# Patient Record
Sex: FEMALE | Race: WHITE
[De-identification: names, ages, dates, MRNs, and addresses within clinical notes are randomized per-mention and may not be internally consistent; named-entity substitution may affect disease eponyms.]

---

## 2017-09-14 ENCOUNTER — HOSPITAL ENCOUNTER (EMERGENCY)
Dept: HOSPITAL 10 - FTE | Age: 32
Discharge: HOME | End: 2017-09-14
Payer: MEDICAID

## 2017-09-14 VITALS
BODY MASS INDEX: 25.43 KG/M2 | BODY MASS INDEX: 25.43 KG/M2 | WEIGHT: 162.04 LBS | HEIGHT: 67 IN | WEIGHT: 162.04 LBS | HEIGHT: 67 IN

## 2017-09-14 DIAGNOSIS — O26.891: Primary | ICD-10-CM

## 2017-09-14 DIAGNOSIS — Z3A.01: ICD-10-CM

## 2017-09-14 LAB
ADD UMIC: NO
BASOPHILS # BLD AUTO: 0 10^3/UL (ref 0–0.1)
BASOPHILS NFR BLD: 0.3 % (ref 0–2)
COLOR UR: YELLOW
EOSINOPHIL # BLD: 0 10^3/UL (ref 0–0.5)
EOSINOPHIL NFR BLD: 0.2 % (ref 0–7)
ERYTHROCYTE [DISTWIDTH] IN BLOOD BY AUTOMATED COUNT: 12.1 % (ref 11.5–14.5)
GLUCOSE UR STRIP-MCNC: NEGATIVE MG/DL
HCT VFR BLD CALC: 37.1 % (ref 37–47)
HGB BLD-MCNC: 12.5 G/DL (ref 12–16)
KETONES UR STRIP.AUTO-MCNC: NEGATIVE MG/DL
LYMPHOCYTES # BLD AUTO: 2.2 10^3/UL (ref 0.8–2.9)
LYMPHOCYTES NFR BLD AUTO: 22.6 % (ref 15–51)
MCH RBC QN AUTO: 32.1 PG (ref 29–33)
MCHC RBC AUTO-ENTMCNC: 33.7 G/DL (ref 32–37)
MCV RBC AUTO: 95.4 FL (ref 82–101)
MONOCYTES # BLD: 0.6 10^3/UL (ref 0.3–0.9)
MONOCYTES NFR BLD: 6.1 % (ref 0–11)
NEUTROPHILS # BLD: 6.8 10^3/UL (ref 1.6–7.5)
NEUTROPHILS NFR BLD AUTO: 70.3 % (ref 39–77)
NITRITE UR QL STRIP.AUTO: NEGATIVE MG/DL
NRBC # BLD MANUAL: 0 10^3/UL (ref 0–0)
NRBC BLD AUTO-RTO: 0 /100WBC (ref 0–0)
PLATELET # BLD: 336 10^3/UL (ref 140–415)
PMV BLD AUTO: 9.9 FL (ref 7.4–10.4)
RBC # BLD AUTO: 3.89 10^6/UL (ref 4.2–5.4)
RBC # UR AUTO: NEGATIVE MG/DL
UR BILIRUBIN (DIP): NEGATIVE MG/DL
UR CLARITY: CLEAR
UR PH (DIP): 6 (ref 5–9)
UR SPECIFIC GRAVITY (DIP): < 1.005 (ref 1–1.03)
UR TOTAL PROTEIN (DIP): NEGATIVE MG/DL
UROBILINOGEN UR STRIP-ACNC: (no result) MG/DL
WBC # BLD AUTO: 9.7 10^3/UL (ref 4.8–10.8)
WBC # UR STRIP: NEGATIVE LEU/UL

## 2017-09-14 PROCEDURE — 84702 CHORIONIC GONADOTROPIN TEST: CPT

## 2017-09-14 PROCEDURE — 86901 BLOOD TYPING SEROLOGIC RH(D): CPT

## 2017-09-14 PROCEDURE — 85025 COMPLETE CBC W/AUTO DIFF WBC: CPT

## 2017-09-14 PROCEDURE — 86900 BLOOD TYPING SEROLOGIC ABO: CPT

## 2017-09-14 PROCEDURE — 76801 OB US < 14 WKS SINGLE FETUS: CPT

## 2017-09-14 PROCEDURE — 81003 URINALYSIS AUTO W/O SCOPE: CPT

## 2017-09-14 PROCEDURE — 36415 COLL VENOUS BLD VENIPUNCTURE: CPT

## 2017-09-14 PROCEDURE — 76817 TRANSVAGINAL US OBSTETRIC: CPT

## 2017-09-14 NOTE — RADRPT
PROCEDURE:   US OB. 

 

CLINICAL INDICATION:   Vaginal bleeding

 

TECHNIQUE:   Transabdominal and endovaginal imaging of the gravid uterus is available for review 

 

COMPARISON:   None available  

 

FINDINGS:

 

There is a single intrauterine pregnancy demonstrating a heart rate of 148 bpm.  The mean sac diamet
er equals 1.6 cm, giving an estimated gestational age of 6 weeks 2 days by ultrasound criteria. A yo
lk sac is present. The fetal pole is not well visualized, however upper fetal cardiac activity is de
monstrated. No subchorionic hemorrhage is identified. The ovaries are unremarkable.

 

IMPRESSION:

 

Single live intrauterine pregnancy with an estimated gestational age of 6 weeks 2 days by ultrasound
 criteria and an estimated date of delivery of 05/08/2018. The fetal pole is not well visualized alt
kassandra fetal cardiac activity is identified. Follow-up ultrasound in 1 week is recommended to ensure 
interval growth.

 

RPTAT: HH

_____________________________________________ 

.Isabelle Werner MD, MD           Date    Time 

Electronically viewed and signed by .Isabelle Werner MD, MD on 09/14/2017 11:38 

 

D:  09/14/2017 11:38  T:  09/14/2017 11:38

.G/

## 2017-09-14 NOTE — ERD
ER Documentation


Chief Complaint


Date/Time


DATE: 9/14/17 


TIME: 12:15


Chief Complaint


lower abdominal pain radiates to the back -  PREGNANT - LMP 7/13/17





HPI


This 32-year-old female complains of some lower abdominal pain the left side 

for last few days.  Radiates slightly to her back.  She is possibly 8 or 9 

weeks pregnant by dates.  She has bleeding or dysuria or fevers or vomiting.  

She is a G1 para 0.





ROS


All systems reviewed and are negative except as per history of present illness.





Medications


Home Meds


Active Scripts


Acetaminophen* (Tylophen*) 500 Mg Capsule, 1 CAP PO Q6H Y for PAIN AND OR 

ELEVATED TEMP, #15 CAP


   Prov:CARL OROURKE MD         9/14/17


Ibuprofen* (Motrin*) 600 Mg Tab, 600 MG PO Q6, #20 TAB


   Prov:CARL OROURKE MD         9/6/16





PMhx/Soc


Medical and Surgical Hx:  pt denies Medical Hx, pt denies Surgical Hx


Hx Alcohol Use:  No


Hx Substance Use:  No


Hx Tobacco Use:  No


Smoking Status:  Never smoker





Physical Exam


Vitals





Vital Signs








  Date Time  Temp Pulse Resp B/P Pulse Ox O2 Delivery O2 Flow Rate FiO2


 


9/14/17 09:52 98.9 74 19 118/71 100   








Physical Exam


Const:  []Alert, not ill-appearing.


Head:   Atraumatic 


Eyes:    Normal Conjunctiva


ENT:    Normal External Ears, Nose and Mouth.


Neck:               Full range of motion..~ No meningismus.


Resp:    Clear to auscultation bilaterally


Cardio:    Regular rate and rhythm, no murmurs


Abd:    Soft,Minimal left mid abdominal tenderness.  No rebound and no 

tenderness at McBurney's point no James sign. non distended. Normal bowel 

sounds


Skin:    No petechiae or rashes


Back:    No midline or flank tenderness


Ext:    No cyanosis, or edema


Neur:    Awake and alert


Psych:    Normal Mood and Affect


Result Diagram:  


9/14/17 1107





Results 24 hrs





 Laboratory Tests








Test


  9/14/17


11:07 9/14/17


11:09


 


White Blood Count 9.710^3/ul  


 


Red Blood Count 3.8910^6/ul  


 


Hemoglobin 12.5g/dl  


 


Hematocrit 37.1%  


 


Mean Corpuscular Volume 95.4fl  


 


Mean Corpuscular Hemoglobin 32.1pg  


 


Mean Corpuscular Hemoglobin


Concent 33.7g/dl 


  


 


 


Red Cell Distribution Width 12.1%  


 


Platelet Count 89949^3/UL  


 


Mean Platelet Volume 9.9fl  


 


Neutrophils % 70.3%  


 


Lymphocytes % 22.6%  


 


Monocytes % 6.1%  


 


Eosinophils % 0.2%  


 


Basophils % 0.3%  


 


Nucleated Red Blood Cells % 0.0/100WBC  


 


Neutrophils # 6.810^3/ul  


 


Lymphocytes # 2.210^3/ul  


 


Monocytes # 0.610^3/ul  


 


Eosinophils # 0.010^3/ul  


 


Basophils # 0.010^3/ul  


 


Nucleated Red Blood Cells # 0.010^3/ul  


 


Urine Color  YELLOW 


 


Urine Clarity  CLEAR 


 


Urine pH  6.0 


 


Urine Specific Gravity  < 1.005 


 


Urine Ketones  NEGATIVEmg/dL 


 


Urine Nitrite  NEGATIVEmg/dL 


 


Urine Bilirubin  NEGATIVEmg/dL 


 


Urine Urobilinogen


  


  0.2


E.U./dLmg/dL


 


Urine Leukocyte Esterase  NEGATIVELeu/ul 


 


Urine Hemoglobin  NEGATIVEmg/dL 


 


Urine Glucose  NEGATIVEmg/dL 


 


Urine Total Protein  NEGATIVEmg/dl 











Procedures/MDM


Urine is negative for infection, hemoglobin, glucose.  Pelvic ultrasound shows 

approximately 6 week intrauterine likely gestational sac and yolk sac with 

fetal heart tones.  There is no evidence of ectopic pregnancy or adnexal 

masses.  Patient presents with lower abdominal pain of uncertain etiology and 

early pregnancy.  CBC is normal.  Signs or symptoms do not suggest ectopic 

pregnancy, appendicitis, UTI, additional causes of presenting complaints.  She 

will discharged home with instructions for rest, fluids, Tylenol and primary 

care and OB follow-up.  The patient was stable with no new complaints during 

the ER course. Clinically, there is no current evidence to suggest meningitis, 

sepsis, acute abdomen, pneumonia, acute coronary syndrome, pulmonary embolism, 

or any other emergent condition appearing to require further evaluation or 

hospitalization. The patient should certainly return for any new or worsening 

symptoms per the aftercare instructions. They should otherwise follow-up with 

her primary care doctor for reevaluation this week.





Departure


Diagnosis:  


 Primary Impression:  


 Abdominal pain


 Abdominal location:  left lower quadrant  Qualified Code:  R10.32 - Left lower 

quadrant pain


Condition:  Stable


Patient Instructions:  Abdominal Pain, Early Pregnancy





Additional Instructions:  


Examines normal hoy. ULTRASONIDO DICE HOMAR EMBARAZO DE 6 SEMANAS EN MATRIZ. 

Cheque otro vez con murphy doctor primario en el proximo noel or regresa para mas o 

nueva simptomas.











CARL OROURKE MD Sep 14, 2017 12:17

## 2017-10-05 ENCOUNTER — HOSPITAL ENCOUNTER (EMERGENCY)
Dept: HOSPITAL 10 - FTE | Age: 32
Discharge: HOME | End: 2017-10-05
Payer: MEDICAID

## 2017-10-05 VITALS
DIASTOLIC BLOOD PRESSURE: 73 MMHG | TEMPERATURE: 98.3 F | HEART RATE: 93 BPM | SYSTOLIC BLOOD PRESSURE: 116 MMHG | RESPIRATION RATE: 16 BRPM

## 2017-10-05 VITALS
BODY MASS INDEX: 27.1 KG/M2 | WEIGHT: 158.73 LBS | WEIGHT: 158.73 LBS | BODY MASS INDEX: 27.1 KG/M2 | HEIGHT: 64 IN | HEIGHT: 64 IN

## 2017-10-05 DIAGNOSIS — O20.9: Primary | ICD-10-CM

## 2017-10-05 LAB
ADD UMIC: YES
BASOPHILS # BLD AUTO: 0 10^3/UL (ref 0–0.1)
BASOPHILS NFR BLD: 0.2 % (ref 0–2)
COLOR UR: YELLOW
EOSINOPHIL # BLD: 0 10^3/UL (ref 0–0.5)
EOSINOPHIL NFR BLD: 0.1 % (ref 0–7)
ERYTHROCYTE [DISTWIDTH] IN BLOOD BY AUTOMATED COUNT: 12.1 % (ref 11.5–14.5)
GLUCOSE UR STRIP-MCNC: NEGATIVE MG/DL
HCT VFR BLD CALC: 35.7 % (ref 37–47)
HGB BLD-MCNC: 12.6 G/DL (ref 12–16)
KETONES UR STRIP.AUTO-MCNC: (no result) MG/DL
LYMPHOCYTES # BLD AUTO: 2.1 10^3/UL (ref 0.8–2.9)
LYMPHOCYTES NFR BLD AUTO: 13.2 % (ref 15–51)
MCH RBC QN AUTO: 33.3 PG (ref 29–33)
MCHC RBC AUTO-ENTMCNC: 35.3 G/DL (ref 32–37)
MCV RBC AUTO: 94.4 FL (ref 82–101)
MONOCYTES # BLD: 0.7 10^3/UL (ref 0.3–0.9)
MONOCYTES NFR BLD: 4.2 % (ref 0–11)
NEUTROPHILS # BLD: 13.2 10^3/UL (ref 1.6–7.5)
NEUTROPHILS NFR BLD AUTO: 81.7 % (ref 39–77)
NITRITE UR QL STRIP.AUTO: NEGATIVE MG/DL
NRBC # BLD MANUAL: 0 10^3/UL (ref 0–0)
NRBC BLD AUTO-RTO: 0 /100WBC (ref 0–0)
PLATELET # BLD: 316 10^3/UL (ref 140–415)
PMV BLD AUTO: 9.4 FL (ref 7.4–10.4)
RBC # BLD AUTO: 3.78 10^6/UL (ref 4.2–5.4)
RBC # UR AUTO: (no result) MG/DL
UR ASCORBIC ACID: NEGATIVE MG/DL
UR BILIRUBIN (DIP): NEGATIVE MG/DL
UR CLARITY: CLEAR
UR PH (DIP): 6 (ref 5–9)
UR RBC: > 182 /HPF (ref 0–5)
UR SPECIFIC GRAVITY (DIP): 1.01 (ref 1–1.03)
UR TOTAL PROTEIN (DIP): NEGATIVE MG/DL
UROBILINOGEN UR STRIP-ACNC: NEGATIVE MG/DL
WBC # BLD AUTO: 16.2 10^3/UL (ref 4.8–10.8)
WBC # UR STRIP: NEGATIVE LEU/UL

## 2017-10-05 PROCEDURE — 36415 COLL VENOUS BLD VENIPUNCTURE: CPT

## 2017-10-05 PROCEDURE — 76817 TRANSVAGINAL US OBSTETRIC: CPT

## 2017-10-05 PROCEDURE — 86900 BLOOD TYPING SEROLOGIC ABO: CPT

## 2017-10-05 PROCEDURE — 84702 CHORIONIC GONADOTROPIN TEST: CPT

## 2017-10-05 PROCEDURE — 85025 COMPLETE CBC W/AUTO DIFF WBC: CPT

## 2017-10-05 PROCEDURE — 76801 OB US < 14 WKS SINGLE FETUS: CPT

## 2017-10-05 PROCEDURE — 81001 URINALYSIS AUTO W/SCOPE: CPT

## 2017-10-05 PROCEDURE — 86901 BLOOD TYPING SEROLOGIC RH(D): CPT

## 2017-10-05 NOTE — ERD
ER Documentation


Chief Complaint


Date/Time


DATE: 10/5/17 


TIME: 20:48


Chief Complaint


vaginal bleeding x 2 days  "large clot" x today  2 months pregnant





HPI


32-year-old female  0 presenting to the emergency department complaining 

of vaginal bleeding for the past 2 days.  Patient states that she passed a 

large clot today, patient denies any pelvic pain.  She denies any fevers, 

dysuria.





ROS


All systems reviewed and are negative except as per history of present illness.





Medications


Home Meds


Active Scripts


Acetaminophen* (Tylophen*) 500 Mg Capsule, 1 CAP PO Q6H Y for PAIN AND OR 

ELEVATED TEMP, #15 CAP


   Prov:CARL OROURKE MD         17


Ibuprofen* (Motrin*) 600 Mg Tab, 600 MG PO Q6, #20 TAB


   Prov:CARL OROURKE MD         16





PMhx/Soc


Medical and Surgical Hx:  pt denies Medical Hx, pt denies Surgical Hx


Hx Alcohol Use:  No


Hx Substance Use:  No


Hx Tobacco Use:  No


Smoking Status:  Never smoker





Physical Exam


Vitals





Vital Signs








  Date Time  Temp Pulse Resp B/P Pulse Ox O2 Delivery O2 Flow Rate FiO2


 


10/5/17 18:08 98.9 124 20 135/81 98   








Physical Exam


Const:  []


Head:   Atraumatic 


Eyes:    Normal Conjunctiva


ENT:    Normal External Ears, Nose and Mouth.


Neck:               Full range of motion..~ No meningismus.


Resp:    Clear to auscultation bilaterally


Cardio:    Regular rate and rhythm, no murmurs


Abd:    Soft, non tender, non distended. Normal bowel sounds


Skin:    No petechiae or rashes


Back:    No midline or flank tenderness


Ext:    No cyanosis, or edema


Neur:    Awake and alert


Psych:    Normal Mood and Affect


Result Diagram:  


10/5/17 1910





Results 24 hrs





 Laboratory Tests








Test


  10/5/17


19:10 10/5/17


20:00


 


White Blood Count 16.210^3/ul  


 


Red Blood Count 3.7810^6/ul  


 


Hemoglobin 12.6g/dl  


 


Hematocrit 35.7%  


 


Mean Corpuscular Volume 94.4fl  


 


Mean Corpuscular Hemoglobin 33.3pg  


 


Mean Corpuscular Hemoglobin


Concent 35.3g/dl 


  


 


 


Red Cell Distribution Width 12.1%  


 


Platelet Count 13606^3/UL  


 


Mean Platelet Volume 9.4fl  


 


Neutrophils % 81.7%  


 


Lymphocytes % 13.2%  


 


Monocytes % 4.2%  


 


Eosinophils % 0.1%  


 


Basophils % 0.2%  


 


Nucleated Red Blood Cells % 0.0/100WBC  


 


Neutrophils # 13.210^3/ul  


 


Lymphocytes # 2.110^3/ul  


 


Monocytes # 0.710^3/ul  


 


Eosinophils # 0.010^3/ul  


 


Basophils # 0.010^3/ul  


 


Nucleated Red Blood Cells # 0.010^3/ul  


 


Beta HCG, Quantitative 1516.4mIU/ml  


 


Urine Color  YELLOW 


 


Urine Clarity  CLEAR 


 


Urine pH  6.0 


 


Urine Specific Gravity  1.009 


 


Urine Ketones  TRACEmg/dL 


 


Urine Nitrite  NEGATIVEmg/dL 


 


Urine Bilirubin  NEGATIVEmg/dL 


 


Urine Urobilinogen  NEGATIVEmg/dL 


 


Urine Leukocyte Esterase  NEGATIVELeu/ul 


 


Urine Microscopic RBC  > 182/HPF 


 


Urine Microscopic WBC  0/HPF 


 


Urine Hemoglobin  3+mg/dL 


 


Urine Glucose  NEGATIVEmg/dL 


 


Urine Total Protein  NEGATIVEmg/dl 











Procedures/MDM


A 32-year-old female  who is 8 weeks pregnant presenting to the emergency 

department complaining of vaginal bleeding for the past 2 days, patient likely 

had a complete . No gestational sac noted on exam. Discussed to follow-

up with her OBGYN for further evaluation and management. No evidence of urinary 

tract infection. Patient has leukocytosis likely due to stress reaction. 

hemodynamically stable to be. Diagnostic testing has been given to patient, 

discussed to follow-up with an OB-GYN or here in 2 days to trend hcg. Discussed 

to return to the ED for any worsening signs or symptoms. Patient understood and 

agreed with this plan.








Ob ultrasound - 


1.  No sonographic evidence for an intrauterine or extrauterine pregnancy. The 

previously noted gestational sac seen on the prior ultrasound 2017 is not 

seen on the current examination. Consider correlation with continued beta HCG 

levels as clinically warranted. 


2.  Thickened endometrium.





Departure


Diagnosis:  


 Primary Impression:  


 Vaginal bleeding in pregnant patient at less than 20 weeks ges...


Condition:  Stable


Patient Instructions:  Bleeding During Early Pregnancy


Referrals:  


NO PRIMARY,CARE PHYSICIAN (PCP)





Additional Instructions:  


FOLLOW UP WITH YOUR PRIMARY CARE PHYSICIAN TOMORROW.Return to this facility if 

you are not improving as expected.





Take all medicines as directed.





Return to this facility if you are not improving as expected.











DARIEN BURKS PA-C Oct 5, 2017 20:53

## 2017-10-05 NOTE — RADRPT
PROCEDURE:   US Pelvis. 

 

CLINICAL INDICATION:   Vaginal bleeding 

 

TECHNIQUE:   Multiple sonographic images of the pelvis were obtained utilizing a transabdominal and 
endovaginal technique. The images were reviewed on a PACS workstation. 

 

COMPARISON:   09/14/2017 

 

FINDINGS:

The uterus is visualized and measures 8.4 x 4.2 x 5.6 cm in size. No uterine mass is seen. No eviden
ce of an intrauterine or extrauterine pregnancy is seen. The endometrial echo complex is heterogeneo
us and thickened and measures 19 mm. There is no evidence for free fluid. The right ovary has a norm
al echotexture and measures 3.7 x 2.3 x 2.7 cm.  The left ovary has a normal echotexture and measure
s 3.1 x 1.7 bowel 1.8 cm.  No adnexal masses are noted. 

 

IMPRESSION:

 

1.  No sonographic evidence for an intrauterine or extrauterine pregnancy. The previously noted gest
ational sac seen on the prior ultrasound 09/14/2017 is not seen on the current examination. Consider
 correlation with continued beta HCG levels as clinically warranted. 

2.  Thickened endometrium.

 

RPTAT: HPNM

_____________________________________________ 

Physician Jagruti           Date    Time 

Electronically viewed and signed by Physician Jagruti on 10/05/2017 19:52 

 

D:  10/05/2017 19:52  T:  10/05/2017 19:52

PM/

## 2018-06-22 ENCOUNTER — HOSPITAL ENCOUNTER (EMERGENCY)
Age: 33
Discharge: LEFT BEFORE DISCHARGE | End: 2018-06-22

## 2018-06-22 ENCOUNTER — HOSPITAL ENCOUNTER (EMERGENCY)
Dept: HOSPITAL 91 - FTE | Age: 33
Discharge: LEFT BEFORE DISCHARGE | End: 2018-06-22
Payer: SELF-PAY

## 2018-06-22 DIAGNOSIS — Z53.21: Primary | ICD-10-CM

## 2018-07-13 ENCOUNTER — HOSPITAL ENCOUNTER (EMERGENCY)
Dept: HOSPITAL 91 - FTE | Age: 33
Discharge: HOME | End: 2018-07-13
Payer: MEDICAID

## 2018-07-13 ENCOUNTER — HOSPITAL ENCOUNTER (EMERGENCY)
Age: 33
Discharge: HOME | End: 2018-07-13

## 2018-07-13 DIAGNOSIS — Z3A.01: ICD-10-CM

## 2018-07-13 DIAGNOSIS — O26.891: Primary | ICD-10-CM

## 2018-07-13 DIAGNOSIS — R10.2: ICD-10-CM

## 2018-07-13 LAB
ADD MAN DIFF?: NO
ADD UMIC: YES
BASOPHIL #: 0 10^3/UL (ref 0–0.1)
BASOPHILS %: 0.3 % (ref 0–2)
EOSINOPHILS #: 0 10^3/UL (ref 0–0.5)
EOSINOPHILS %: 0.3 % (ref 0–7)
HEMATOCRIT: 33.6 % (ref 37–47)
HEMOGLOBIN: 11.6 G/DL (ref 12–16)
LYMPHOCYTES #: 2.8 10^3/UL (ref 0.8–2.9)
LYMPHOCYTES %: 29 % (ref 15–51)
MEAN CORPUSCULAR HEMOGLOBIN: 33 PG (ref 29–33)
MEAN CORPUSCULAR HGB CONC: 34.5 G/DL (ref 32–37)
MEAN CORPUSCULAR VOLUME: 95.5 FL (ref 82–101)
MEAN PLATELET VOLUME: 9.9 FL (ref 7.4–10.4)
MONOCYTE #: 0.7 10^3/UL (ref 0.3–0.9)
MONOCYTES %: 7.3 % (ref 0–11)
NEUTROPHIL #: 5.9 10^3/UL (ref 1.6–7.5)
NEUTROPHILS %: 62.5 % (ref 39–77)
NUCLEATED RED BLOOD CELLS #: 0 10^3/UL (ref 0–0)
NUCLEATED RED BLOOD CELLS%: 0 /100WBC (ref 0–0)
PLATELET COUNT: 307 10^3/UL (ref 140–415)
RED BLOOD COUNT: 3.52 10^6/UL (ref 4.2–5.4)
RED CELL DISTRIBUTION WIDTH: 12 % (ref 11.5–14.5)
UR ASCORBIC ACID: NEGATIVE MG/DL
UR BACTERIA: (no result) /HPF
UR BILIRUBIN (DIP): NEGATIVE MG/DL
UR BLOOD (DIP): NEGATIVE MG/DL
UR CLARITY: CLEAR
UR COLOR: YELLOW
UR GLUCOSE (DIP): NEGATIVE MG/DL
UR KETONES (DIP): NEGATIVE MG/DL
UR LEUKOCYTE ESTERASE (DIP): (no result) LEU/UL
UR NITRITE (DIP): NEGATIVE MG/DL
UR PH (DIP): 8 (ref 5–9)
UR RBC: 1 /HPF (ref 0–5)
UR SPECIFIC GRAVITY (DIP): 1.01 (ref 1–1.03)
UR SQUAMOUS EPITHELIAL CELL: (no result) /HPF
UR TOTAL PROTEIN (DIP): NEGATIVE MG/DL
UR UROBILINOGEN (DIP): NEGATIVE MG/DL
UR WBC: 1 /HPF (ref 0–5)
URINE PH (DIP) POC: 8 (ref 5–8.5)
WHITE BLOOD COUNT: 9.5 10^3/UL (ref 4.8–10.8)

## 2018-07-13 PROCEDURE — 81025 URINE PREGNANCY TEST: CPT

## 2018-07-13 PROCEDURE — 86900 BLOOD TYPING SEROLOGIC ABO: CPT

## 2018-07-13 PROCEDURE — 81001 URINALYSIS AUTO W/SCOPE: CPT

## 2018-07-13 PROCEDURE — 76801 OB US < 14 WKS SINGLE FETUS: CPT

## 2018-07-13 PROCEDURE — 76817 TRANSVAGINAL US OBSTETRIC: CPT

## 2018-07-13 PROCEDURE — 84702 CHORIONIC GONADOTROPIN TEST: CPT

## 2018-07-13 PROCEDURE — 86901 BLOOD TYPING SEROLOGIC RH(D): CPT

## 2018-07-13 PROCEDURE — 81003 URINALYSIS AUTO W/O SCOPE: CPT

## 2018-07-13 PROCEDURE — 99284 EMERGENCY DEPT VISIT MOD MDM: CPT

## 2018-07-13 PROCEDURE — 87086 URINE CULTURE/COLONY COUNT: CPT

## 2018-07-13 PROCEDURE — 85025 COMPLETE CBC W/AUTO DIFF WBC: CPT

## 2018-07-13 PROCEDURE — 36415 COLL VENOUS BLD VENIPUNCTURE: CPT

## 2018-07-13 RX ADMIN — ACETAMINOPHEN 1 MG: 325 TABLET, FILM COATED ORAL at 16:41

## 2018-07-24 ENCOUNTER — HOSPITAL ENCOUNTER (EMERGENCY)
Dept: HOSPITAL 91 - FTE | Age: 33
LOS: 1 days | Discharge: HOME | End: 2018-07-25
Payer: MEDICAID

## 2018-07-24 ENCOUNTER — HOSPITAL ENCOUNTER (EMERGENCY)
Age: 33
LOS: 1 days | Discharge: HOME | End: 2018-07-25

## 2018-07-24 DIAGNOSIS — O20.0: Primary | ICD-10-CM

## 2018-07-24 DIAGNOSIS — Z3A.01: ICD-10-CM

## 2018-07-24 PROCEDURE — 76817 TRANSVAGINAL US OBSTETRIC: CPT

## 2018-07-24 PROCEDURE — 36415 COLL VENOUS BLD VENIPUNCTURE: CPT

## 2018-07-24 PROCEDURE — 84702 CHORIONIC GONADOTROPIN TEST: CPT

## 2018-07-24 PROCEDURE — 85025 COMPLETE CBC W/AUTO DIFF WBC: CPT

## 2018-07-24 PROCEDURE — 86901 BLOOD TYPING SEROLOGIC RH(D): CPT

## 2018-07-24 PROCEDURE — 81001 URINALYSIS AUTO W/SCOPE: CPT

## 2018-07-24 PROCEDURE — 76801 OB US < 14 WKS SINGLE FETUS: CPT

## 2018-07-24 PROCEDURE — 86900 BLOOD TYPING SEROLOGIC ABO: CPT

## 2018-07-24 PROCEDURE — 99284 EMERGENCY DEPT VISIT MOD MDM: CPT

## 2018-07-25 LAB
ADD MAN DIFF?: NO
ADD UMIC: YES
BASOPHIL #: 0 10^3/UL (ref 0–0.1)
BASOPHILS %: 0.3 % (ref 0–2)
EOSINOPHILS #: 0 10^3/UL (ref 0–0.5)
EOSINOPHILS %: 0.4 % (ref 0–7)
HEMATOCRIT: 34.5 % (ref 37–47)
HEMOGLOBIN: 11.6 G/DL (ref 12–16)
LYMPHOCYTES #: 2.9 10^3/UL (ref 0.8–2.9)
LYMPHOCYTES %: 31.2 % (ref 15–51)
MEAN CORPUSCULAR HEMOGLOBIN: 32.6 PG (ref 29–33)
MEAN CORPUSCULAR HGB CONC: 33.6 G/DL (ref 32–37)
MEAN CORPUSCULAR VOLUME: 96.9 FL (ref 82–101)
MEAN PLATELET VOLUME: 9.8 FL (ref 7.4–10.4)
MONOCYTE #: 0.7 10^3/UL (ref 0.3–0.9)
MONOCYTES %: 7.1 % (ref 0–11)
NEUTROPHIL #: 5.6 10^3/UL (ref 1.6–7.5)
NEUTROPHILS %: 60.5 % (ref 39–77)
NUCLEATED RED BLOOD CELLS #: 0 10^3/UL (ref 0–0)
NUCLEATED RED BLOOD CELLS%: 0 /100WBC (ref 0–0)
PLATELET COUNT: 312 10^3/UL (ref 140–415)
RED BLOOD COUNT: 3.56 10^6/UL (ref 4.2–5.4)
RED CELL DISTRIBUTION WIDTH: 12.3 % (ref 11.5–14.5)
UR ASCORBIC ACID: NEGATIVE MG/DL
UR BACTERIA: (no result) /HPF
UR BILIRUBIN (DIP): NEGATIVE MG/DL
UR BLOOD (DIP): (no result) MG/DL
UR CLARITY: (no result)
UR COLOR: YELLOW
UR GLUCOSE (DIP): NEGATIVE MG/DL
UR KETONES (DIP): NEGATIVE MG/DL
UR LEUKOCYTE ESTERASE (DIP): NEGATIVE LEU/UL
UR NITRITE (DIP): NEGATIVE MG/DL
UR PH (DIP): 7 (ref 5–9)
UR RBC: 1 /HPF (ref 0–5)
UR SPECIFIC GRAVITY (DIP): 1.01 (ref 1–1.03)
UR SQUAMOUS EPITHELIAL CELL: (no result) /HPF
UR TOTAL PROTEIN (DIP): NEGATIVE MG/DL
UR UROBILINOGEN (DIP): NEGATIVE MG/DL
UR WBC: 3 /HPF (ref 0–5)
WHITE BLOOD COUNT: 9.2 10^3/UL (ref 4.8–10.8)

## 2018-07-27 ENCOUNTER — HOSPITAL ENCOUNTER (OUTPATIENT)
Age: 33
Discharge: HOME | End: 2018-07-27

## 2018-07-27 ENCOUNTER — HOSPITAL ENCOUNTER (OUTPATIENT)
Dept: HOSPITAL 91 - SDS | Age: 33
Discharge: HOME | End: 2018-07-27
Payer: MEDICAID

## 2018-07-27 DIAGNOSIS — O03.4: Primary | ICD-10-CM

## 2018-07-27 LAB
ADD MAN DIFF?: NO
ADD UMIC: NO
BASOPHIL #: 0 10^3/UL (ref 0–0.1)
BASOPHILS %: 0.3 % (ref 0–2)
EOSINOPHILS #: 0 10^3/UL (ref 0–0.5)
EOSINOPHILS %: 0.1 % (ref 0–7)
HEMATOCRIT: 35.1 % (ref 37–47)
HEMOGLOBIN: 12.1 G/DL (ref 12–16)
LYMPHOCYTES #: 2.5 10^3/UL (ref 0.8–2.9)
LYMPHOCYTES %: 30.9 % (ref 15–51)
MEAN CORPUSCULAR HEMOGLOBIN: 33.2 PG (ref 29–33)
MEAN CORPUSCULAR HGB CONC: 34.5 G/DL (ref 32–37)
MEAN CORPUSCULAR VOLUME: 96.4 FL (ref 82–101)
MEAN PLATELET VOLUME: 9.8 FL (ref 7.4–10.4)
MONOCYTE #: 0.4 10^3/UL (ref 0.3–0.9)
MONOCYTES %: 5 % (ref 0–11)
NEUTROPHIL #: 5 10^3/UL (ref 1.6–7.5)
NEUTROPHILS %: 63.2 % (ref 39–77)
NUCLEATED RED BLOOD CELLS #: 0 10^3/UL (ref 0–0)
NUCLEATED RED BLOOD CELLS%: 0 /100WBC (ref 0–0)
PLATELET COUNT: 285 10^3/UL (ref 140–415)
RED BLOOD COUNT: 3.64 10^6/UL (ref 4.2–5.4)
RED CELL DISTRIBUTION WIDTH: 12.3 % (ref 11.5–14.5)
UR ASCORBIC ACID: 20 MG/DL
UR BILIRUBIN (DIP): NEGATIVE MG/DL
UR BLOOD (DIP): NEGATIVE MG/DL
UR CLARITY: CLEAR
UR COLOR: YELLOW
UR GLUCOSE (DIP): NEGATIVE MG/DL
UR KETONES (DIP): NEGATIVE MG/DL
UR LEUKOCYTE ESTERASE (DIP): NEGATIVE LEU/UL
UR NITRITE (DIP): NEGATIVE MG/DL
UR PH (DIP): 7 (ref 5–9)
UR SPECIFIC GRAVITY (DIP): 1.01 (ref 1–1.03)
UR TOTAL PROTEIN (DIP): NEGATIVE MG/DL
UR UROBILINOGEN (DIP): NEGATIVE MG/DL
WHITE BLOOD COUNT: 8 10^3/UL (ref 4.8–10.8)

## 2018-07-27 PROCEDURE — 88305 TISSUE EXAM BY PATHOLOGIST: CPT

## 2018-07-27 PROCEDURE — 85025 COMPLETE CBC W/AUTO DIFF WBC: CPT

## 2018-07-27 PROCEDURE — 81003 URINALYSIS AUTO W/O SCOPE: CPT

## 2018-07-27 PROCEDURE — 59812 TREATMENT OF MISCARRIAGE: CPT

## 2018-07-27 RX ADMIN — HYDROCODONE BITARTRATE AND ACETAMINOPHEN 1 TAB: 10; 325 TABLET ORAL at 16:05

## 2018-07-27 RX ADMIN — KETOROLAC TROMETHAMINE 1 MG: 30 INJECTION, SOLUTION INTRAMUSCULAR at 14:26

## 2018-07-27 RX ADMIN — FENTANYL CITRATE 1 MCG: 50 INJECTION, SOLUTION INTRAMUSCULAR; INTRAVENOUS at 14:27

## 2019-04-04 ENCOUNTER — HOSPITAL ENCOUNTER (EMERGENCY)
Dept: HOSPITAL 91 - FTE | Age: 34
Discharge: LEFT BEFORE DISCHARGE | End: 2019-04-04
Payer: SELF-PAY

## 2019-04-04 ENCOUNTER — HOSPITAL ENCOUNTER (EMERGENCY)
Dept: HOSPITAL 10 - FTE | Age: 34
Discharge: LEFT BEFORE DISCHARGE | End: 2019-04-04
Payer: MEDICAID

## 2019-04-04 VITALS — DIASTOLIC BLOOD PRESSURE: 55 MMHG | HEART RATE: 87 BPM | RESPIRATION RATE: 18 BRPM | SYSTOLIC BLOOD PRESSURE: 113 MMHG

## 2019-04-04 VITALS
WEIGHT: 160.5 LBS | HEIGHT: 68 IN | WEIGHT: 160.5 LBS | BODY MASS INDEX: 24.32 KG/M2 | HEIGHT: 68 IN | BODY MASS INDEX: 24.32 KG/M2

## 2019-04-04 DIAGNOSIS — Z53.21: Primary | ICD-10-CM
